# Patient Record
Sex: FEMALE | Race: BLACK OR AFRICAN AMERICAN | NOT HISPANIC OR LATINO | URBAN - METROPOLITAN AREA
[De-identification: names, ages, dates, MRNs, and addresses within clinical notes are randomized per-mention and may not be internally consistent; named-entity substitution may affect disease eponyms.]

---

## 2021-09-20 ENCOUNTER — OFFICE VISIT (OUTPATIENT)
Dept: URBAN - METROPOLITAN AREA SURGERY CENTER 13 | Facility: SURGERY CENTER | Age: 41
End: 2021-09-20

## 2021-09-22 ENCOUNTER — P2P PATIENT RECORD (OUTPATIENT)
Age: 41
End: 2021-09-22

## 2021-10-21 ENCOUNTER — DASHBOARD ENCOUNTERS (OUTPATIENT)
Age: 41
End: 2021-10-21

## 2021-10-22 ENCOUNTER — LAB OUTSIDE AN ENCOUNTER (OUTPATIENT)
Dept: URBAN - METROPOLITAN AREA TELEHEALTH 2 | Facility: TELEHEALTH | Age: 41
End: 2021-10-22

## 2021-10-22 ENCOUNTER — OFFICE VISIT (OUTPATIENT)
Dept: URBAN - METROPOLITAN AREA TELEHEALTH 2 | Facility: TELEHEALTH | Age: 41
End: 2021-10-22
Payer: OTHER GOVERNMENT

## 2021-10-22 VITALS — HEIGHT: 69 IN

## 2021-10-22 DIAGNOSIS — K51.40 PSEUDOPOLYPOSIS OF COLON WITHOUT COMPLICATION, UNSPECIFIED PART OF COLON: ICD-10-CM

## 2021-10-22 DIAGNOSIS — K50.119 CROHN'S DISEASE OF COLON WITH COMPLICATION: ICD-10-CM

## 2021-10-22 PROCEDURE — 99204 OFFICE O/P NEW MOD 45 MIN: CPT | Performed by: INTERNAL MEDICINE

## 2021-10-22 NOTE — HPI-TODAY'S VISIT:
Diagnosed with Crohn's Colitis 8/2013, appears to have been pan-colonic with no ileal involvement, did have granulomas and some pseudopolyps Was started on Humira since then. Was in [place] and just moved back here  Last colonoscopy was 3/2017 and she reports had a little inflammation she thinks She reports 6 months since last flare of Crohn's , gets about 2 flares per year  she reports currently with some symptoms, including some trouble with bathroom and post prandial discomfort at times

## 2021-10-25 PROBLEM — 13025001: Status: ACTIVE | Noted: 2021-10-22

## 2021-10-29 PROBLEM — 50440006: Status: ACTIVE | Noted: 2021-10-22

## 2021-11-02 ENCOUNTER — OFFICE VISIT (OUTPATIENT)
Dept: URBAN - METROPOLITAN AREA SURGERY CENTER 23 | Facility: SURGERY CENTER | Age: 41
End: 2021-11-02

## 2021-11-22 ENCOUNTER — OFFICE VISIT (OUTPATIENT)
Dept: URBAN - METROPOLITAN AREA SURGERY CENTER 23 | Facility: SURGERY CENTER | Age: 41
End: 2021-11-22
Payer: OTHER GOVERNMENT

## 2021-11-22 DIAGNOSIS — K50.80 CROHN'S COLITIS: ICD-10-CM

## 2021-11-22 PROCEDURE — 45380 COLONOSCOPY AND BIOPSY: CPT | Performed by: INTERNAL MEDICINE

## 2021-11-22 PROCEDURE — G8907 PT DOC NO EVENTS ON DISCHARG: HCPCS | Performed by: INTERNAL MEDICINE

## 2021-12-16 ENCOUNTER — TELEPHONE ENCOUNTER (OUTPATIENT)
Dept: URBAN - METROPOLITAN AREA CLINIC 118 | Facility: CLINIC | Age: 41
End: 2021-12-16

## 2021-12-22 ENCOUNTER — TELEPHONE ENCOUNTER (OUTPATIENT)
Dept: URBAN - METROPOLITAN AREA SURGERY CENTER 30 | Facility: SURGERY CENTER | Age: 41
End: 2021-12-22

## 2021-12-24 ENCOUNTER — TELEPHONE ENCOUNTER (OUTPATIENT)
Dept: URBAN - METROPOLITAN AREA CLINIC 92 | Facility: CLINIC | Age: 41
End: 2021-12-24

## 2021-12-24 RX ORDER — ADALIMUMAB 40MG/0.4ML
0.4 ML KIT SUBCUTANEOUS EVERY 2 WEEKS
Qty: 6 KIT | Refills: 3

## 2021-12-26 LAB
A/G RATIO: 1.9
ADALIMUMAB DRUG LEVEL: 4.9
ALBUMIN: 4.8
ALKALINE PHOSPHATASE: 77
ALT (SGPT): 40
ANTI-ADALIMUMAB ANTIBODY: <25
AST (SGOT): 35
BASO (ABSOLUTE): 0
BASOS: 1
BILIRUBIN, TOTAL: 0.2
BUN/CREATININE RATIO: 17
BUN: 15
C-REACTIVE PROTEIN, QUANT: 1
CALCIUM: 9.6
CALPROTECTIN, FECAL: 41
CARBON DIOXIDE, TOTAL: 23
CHLORIDE: 104
CREATININE: 0.89
EGFR IF AFRICN AM: 93
EGFR IF NONAFRICN AM: 81
EOS (ABSOLUTE): 0.2
EOS: 3
GLOBULIN, TOTAL: 2.5
GLUCOSE: 85
HBSAG SCREEN: NEGATIVE
HCV AB: <0.1
HEMATOCRIT: 41.9
HEMATOLOGY COMMENTS:: (no result)
HEMOGLOBIN: 13.4
HEP A AB, TOTAL: NEGATIVE
HEPATITIS B SURF AB QUANT: 12.7
IMMATURE CELLS: (no result)
IMMATURE GRANS (ABS): 0
IMMATURE GRANULOCYTES: 1
INTERPRETATION:: (no result)
INTERPRETATION:: (no result)
IRON BIND.CAP.(TIBC): 431
IRON SATURATION: 10
IRON: 43
LYMPHS (ABSOLUTE): 2.5
LYMPHS: 32
Lab: (no result)
MCH: 29.3
MCHC: 32
MCV: 92
MONOCYTES(ABSOLUTE): 0.7
MONOCYTES: 10
NEUTROPHILS (ABSOLUTE): 4.1
NEUTROPHILS: 53
NRBC: (no result)
PLATELETS: 284
POTASSIUM: 4.7
PROTEIN, TOTAL: 7.3
QUANTIFERON CRITERIA: (no result)
QUANTIFERON INCUBATION: (no result)
QUANTIFERON MITOGEN VALUE: >10
QUANTIFERON NIL VALUE: 0.07
QUANTIFERON TB1 AG VALUE: 0.09
QUANTIFERON TB2 AG VALUE: 0.07
QUANTIFERON-TB GOLD PLUS: NEGATIVE
RBC: 4.57
RDW: 13.9
SODIUM: 141
TPMT ACTIVITY: 17.2
UIBC: 388
WBC: 7.6

## 2022-01-06 ENCOUNTER — TELEPHONE ENCOUNTER (OUTPATIENT)
Dept: URBAN - METROPOLITAN AREA CLINIC 118 | Facility: CLINIC | Age: 42
End: 2022-01-06

## 2022-01-06 RX ORDER — ADALIMUMAB 40MG/0.4ML
1 SUBQ  Q 2 WEEKS KIT SUBCUTANEOUS
Qty: 6 | Refills: 3 | OUTPATIENT
Start: 2022-01-06 | End: 2023-01-01

## 2022-02-01 ENCOUNTER — WEB ENCOUNTER (OUTPATIENT)
Dept: URBAN - METROPOLITAN AREA CLINIC 118 | Facility: CLINIC | Age: 42
End: 2022-02-01

## 2022-02-10 ENCOUNTER — TELEPHONE ENCOUNTER (OUTPATIENT)
Dept: URBAN - METROPOLITAN AREA CLINIC 118 | Facility: CLINIC | Age: 42
End: 2022-02-10

## 2022-02-10 RX ORDER — ADALIMUMAB 40MG/0.4ML
1 SUBQ  Q 2 WEEKS KIT SUBCUTANEOUS
Qty: 6 | Refills: 3
Start: 2022-01-06 | End: 2023-02-05

## 2025-07-30 ENCOUNTER — TELEPHONE ENCOUNTER (OUTPATIENT)
Dept: URBAN - METROPOLITAN AREA CLINIC 3 | Facility: CLINIC | Age: 45
End: 2025-07-30

## 2025-08-26 ENCOUNTER — OFFICE VISIT (OUTPATIENT)
Dept: URBAN - METROPOLITAN AREA CLINIC 118 | Facility: CLINIC | Age: 45
End: 2025-08-26